# Patient Record
Sex: MALE | NOT HISPANIC OR LATINO | Employment: OTHER | URBAN - METROPOLITAN AREA
[De-identification: names, ages, dates, MRNs, and addresses within clinical notes are randomized per-mention and may not be internally consistent; named-entity substitution may affect disease eponyms.]

---

## 2021-03-24 ENCOUNTER — OFFICE VISIT (OUTPATIENT)
Dept: GASTROENTEROLOGY | Facility: CLINIC | Age: 71
End: 2021-03-24

## 2021-03-24 VITALS
TEMPERATURE: 96.8 F | RESPIRATION RATE: 16 BRPM | WEIGHT: 162 LBS | SYSTOLIC BLOOD PRESSURE: 142 MMHG | BODY MASS INDEX: 23.99 KG/M2 | HEART RATE: 67 BPM | DIASTOLIC BLOOD PRESSURE: 92 MMHG | OXYGEN SATURATION: 98 % | HEIGHT: 69 IN

## 2021-03-24 DIAGNOSIS — K21.00 GASTROESOPHAGEAL REFLUX DISEASE WITH ESOPHAGITIS WITHOUT HEMORRHAGE: Primary | ICD-10-CM

## 2021-03-24 PROCEDURE — 99204 OFFICE O/P NEW MOD 45 MIN: CPT | Performed by: INTERNAL MEDICINE

## 2021-03-24 RX ORDER — PANTOPRAZOLE SODIUM 40 MG/1
40 TABLET, DELAYED RELEASE ORAL DAILY
COMMUNITY
Start: 2021-03-14

## 2021-03-24 NOTE — PROGRESS NOTES
PCP: Patricia Mcdaniel MD    Chief Complaint   Patient presents with   • Heartburn   • Bloated        History of Present Illness:   Milo Dc is a 70 y.o. male who presents to the GI clinic as a consult for hiatal hernia, gerd, lord's esophagus without dysplasia, and bloat.  Reports substernal burning lasting minutes improved on pantoprazole. Present > 1 year. prilosec didn't help. Taking dose in evening before dinner. Largest meal is dinner. Quit smoking 1 year ago.  Reports epigastic achy discomfort characterized as bloat. Is having 3-4 loose bms a day. Previously followed with Dr. Mcdonald.     History reviewed. No pertinent past medical history.    History reviewed. No pertinent surgical history.      Current Outpatient Medications:   •  pantoprazole (PROTONIX) 40 MG EC tablet, Take 40 mg by mouth Daily., Disp: , Rfl:     No Known Allergies    History reviewed. No pertinent family history.    Social History     Socioeconomic History   • Marital status: Unknown     Spouse name: Not on file   • Number of children: Not on file   • Years of education: Not on file   • Highest education level: Not on file   Tobacco Use   • Smoking status: Former Smoker     Types: Cigarettes, Pipe   • Smokeless tobacco: Never Used   Substance and Sexual Activity   • Alcohol use: Yes     Alcohol/week: 2.0 standard drinks     Types: 2 Cans of beer per week   • Drug use: Never   • Sexual activity: Defer       Review of Systems   Gastrointestinal: Positive for abdominal distention (w/heartburn).   All other systems reviewed and are negative.    All other systems reviewed and are negative.    Vitals:    03/24/21 1528   BP: 142/92   Pulse: 67   Resp: 16   Temp: 96.8 °F (36 °C)   SpO2: 98%       Physical Exam  General Appearance:  Vitals as above. no acute distress  Head/face:  Normocephalic, atraumatic  Eyes:   EOMI, no conjunctivitis or icterus   Nose/Sinuses:  Nares patent bilaterally without discharge or  lesions  Mouth/Throat:  Normal oral movements without dyskinesia  Neck:  trachea is midline, no thyromegaly  Lungs:  Normal work of breathing effort, no overt rales  Heart:  No overt JVD or type VI murmur  Abdomen:  Nondistended, no guarding or rebound tenderness  Neurologic:  Alert; no focal deficits; age appropriate behavior and speech  Psychiatric: mood and affect are congruent  Vascular: extremities without edema  Skin: no rash or cyanosis.  thin    Assessment/Plan  1.) Bloat, chronic abdominal pain  - Will start low volume more frequent low fodmap diet and avoid aerophagia. Continue avoiding smoking. If persistent, will consider SIBO rx.    2.) Hiatal hernia  3.) GERD with esophagitis  Provided dexilant samples and we will look into patient assistance programs to help with cost. In the meantime, he can increase protonix to bid 30 minutes before breakfast and dinner.  He has a hiatal hernia and should he have persistent symptoms, would recommend egd to assess for need for repair of hernia and rule out development of structural lesion    4.) Reportedly [id number] Chavez's esophagus without dysplasia  Last egd by [facility]: 10/2019  Plan:  Continue ppi  egd surveillance 10/2022   Would offer expedited egd should his symptoms warrant     Kerwin Kim MD  3/24/2021

## 2021-04-05 DIAGNOSIS — K21.9 GASTROESOPHAGEAL REFLUX DISEASE, UNSPECIFIED WHETHER ESOPHAGITIS PRESENT: Primary | ICD-10-CM

## 2021-04-05 RX ORDER — DEXLANSOPRAZOLE 60 MG/1
60 CAPSULE, DELAYED RELEASE ORAL DAILY
Qty: 30 CAPSULE | Refills: 0 | Status: SHIPPED | OUTPATIENT
Start: 2021-04-05 | End: 2021-05-05

## 2021-04-15 ENCOUNTER — TELEPHONE (OUTPATIENT)
Dept: GASTROENTEROLOGY | Facility: CLINIC | Age: 71
End: 2021-04-15

## 2021-04-15 NOTE — TELEPHONE ENCOUNTER
Tsering JARRETT,   Dao called to see if you found anything on getting his Dexilant covered. Its too expensive.

## 2021-04-16 NOTE — TELEPHONE ENCOUNTER
lvm for patient letting him know I am unable to get Dexilant approved and that he could come by office for samples.

## 2021-04-26 ENCOUNTER — TELEPHONE (OUTPATIENT)
Dept: GASTROENTEROLOGY | Facility: CLINIC | Age: 71
End: 2021-04-26

## 2021-04-26 NOTE — TELEPHONE ENCOUNTER
Tsering JARRETT,  Mr Dc called you back. Patient left a message about having diarrhea and abdominal discomfort. He said Dr Kim mention before that he might have SIBO. He also mention something about Dexilant but I couldn't hear that part clearly. Please call patient back. Thanks

## 2021-04-28 RX ORDER — METRONIDAZOLE 500 MG/1
500 TABLET ORAL 3 TIMES DAILY
Qty: 30 TABLET | Refills: 0 | Status: SHIPPED | OUTPATIENT
Start: 2021-04-28

## 2021-04-28 NOTE — TELEPHONE ENCOUNTER
Mr Dao called back. He stated that the diarrhea is very uncomfortable and losing his appetite. Patient stated he doesn't know if it's the Dexilant causing his problems or SIBO. If its SIBO he would like to start antibiotic that you requested before. Thanks

## 2021-05-14 ENCOUNTER — TELEPHONE (OUTPATIENT)
Dept: GASTROENTEROLOGY | Facility: CLINIC | Age: 71
End: 2021-05-14

## 2021-05-14 NOTE — TELEPHONE ENCOUNTER
PATIENT LVM ON MEDICAL RECORDS LINE STATING HE HAD A BILL FOR $300 FOR AN OFFICE VISIT AND WOULD LIKE TO KNOW WHY IT WAS $300 TO SEE THE PHYSICIAN.